# Patient Record
Sex: MALE | Race: WHITE
[De-identification: names, ages, dates, MRNs, and addresses within clinical notes are randomized per-mention and may not be internally consistent; named-entity substitution may affect disease eponyms.]

---

## 2016-01-31 VITALS
DIASTOLIC BLOOD PRESSURE: 89 MMHG | SYSTOLIC BLOOD PRESSURE: 135 MMHG | SYSTOLIC BLOOD PRESSURE: 135 MMHG | DIASTOLIC BLOOD PRESSURE: 89 MMHG

## 2017-03-22 ENCOUNTER — HOSPITAL ENCOUNTER (OUTPATIENT)
Dept: HOSPITAL 44 - LAB | Age: 42
End: 2017-03-22
Attending: FAMILY MEDICINE
Payer: COMMERCIAL

## 2017-03-22 DIAGNOSIS — I10: Primary | ICD-10-CM

## 2017-03-22 LAB
EGFR (AFRICAN): > 60
EGFR (NON-AFRICAN): > 60

## 2017-03-22 PROCEDURE — 36415 COLL VENOUS BLD VENIPUNCTURE: CPT

## 2017-03-22 PROCEDURE — 80048 BASIC METABOLIC PNL TOTAL CA: CPT

## 2017-09-19 ENCOUNTER — HOSPITAL ENCOUNTER (OUTPATIENT)
Dept: HOSPITAL 44 - RAD | Age: 42
End: 2017-09-19
Attending: PHYSICIAN ASSISTANT
Payer: COMMERCIAL

## 2017-09-19 DIAGNOSIS — M79.671: Primary | ICD-10-CM

## 2017-09-19 LAB
BASOPHILS NFR BLD: 0.7 % (ref 0–1.5)
EOSINOPHIL NFR BLD: 2.3 % (ref 0–6.8)
MCH RBC QN AUTO: 30.2 PG (ref 28–34)
MCV RBC AUTO: 83 FL (ref 80–100)
MONOCYTES %: 4.6 % (ref 0–11)
NEUTROPHILS #: 9.1 # K/UL (ref 1.4–7.7)

## 2017-09-19 PROCEDURE — 85025 COMPLETE CBC W/AUTO DIFF WBC: CPT

## 2017-09-19 PROCEDURE — 36415 COLL VENOUS BLD VENIPUNCTURE: CPT

## 2017-09-19 PROCEDURE — 84550 ASSAY OF BLOOD/URIC ACID: CPT

## 2017-09-19 PROCEDURE — 73630 X-RAY EXAM OF FOOT: CPT

## 2017-09-19 NOTE — DIAGNOSTIC IMAGING REPORT
PABLO KENNEDY~

 [facility]

 [address] [facility] [address] [address]

~

Report Submission Date: Sep 19, 2017 2:13:40 PM CDT







Patient ~ Study  

 

Name: ROSALINE EASLEY ~ Date: Sep 19, 2017 1:38:54 PM CDT

 

MRN:  ~ Modality Type: CR

 

Gender: M ~ Description: LOWER EXTREMITY

 

: 77 ~ Institution: [facility]

 

Physician: PABLO KENNEDY

  ~ ~ ~





~

Examination: Plain film foot ~ 



History:~ Discomfort 



Findings: 3 views of the foot demonstrates normal cortical margins. No fracture 
or dislocation. ~Posterior calcaneal spur. ~No soft tissue swelling. No joint 
effusion. 



Impression: No acute osseous process.

~

Electronically signed on Sep 19, 2017 2:13:40 PM CDT by:

Timothy PELAYO

## 2018-11-10 ENCOUNTER — HOSPITAL ENCOUNTER (EMERGENCY)
Dept: HOSPITAL 44 - ED | Age: 43
Discharge: HOME | End: 2018-11-10
Payer: COMMERCIAL

## 2018-11-10 VITALS
DIASTOLIC BLOOD PRESSURE: 96 MMHG | DIASTOLIC BLOOD PRESSURE: 96 MMHG | DIASTOLIC BLOOD PRESSURE: 96 MMHG | SYSTOLIC BLOOD PRESSURE: 134 MMHG | SYSTOLIC BLOOD PRESSURE: 134 MMHG | SYSTOLIC BLOOD PRESSURE: 134 MMHG
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
## 2018-11-10 DIAGNOSIS — S61.412A: Primary | ICD-10-CM
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
## 2018-11-10 PROCEDURE — 12001 RPR S/N/AX/GEN/TRNK 2.5CM/<: CPT

## 2018-11-10 PROCEDURE — 99282 EMERGENCY DEPT VISIT SF MDM: CPT

## 2018-11-10 NOTE — ED PHYSICIAN DOCUMENTATION
General Adult





- HISTORIAN


Historian: patient





- HPI


Stated Complaint: lac to left hand cutting a deer 


Chief Complaint: Laceration/Recheck/Suture


Onset: minutes (30)


Timing: still present


Further Comments: yes (He reports cutting a deer and cut his finger. The knife 

was dirty - he has full sensation no other complaints. He is UTD on tetanus)





- ROS


CONST: no problems


EYES/ENT: none


CVS/RESP: none


MS/SKIN/LYMPH: other (laceration on left hand ring and first finger )





- PAST HX


Past History: none


Other History: none


Surgeries/Procedures: other (cut his finger off 3 years ago )


Immunizations: UTD


Allergies/Adverse Reactions: 


                                    Allergies











Allergy/AdvReac Type Severity Reaction Status Date / Time


 


No Known Drug Allergies Allergy   Unverified 03/08/13 14:25

















- SOCIAL HX


Smoking History: non-smoker


Alcohol Use: none


Drug Use: none





- FAMILY HX


Family History: No





- VITAL SIGNS


Vital Signs: 


                                   Vital Signs











Temp Pulse Resp BP Pulse Ox


 


    86   18   134/96   99 


 


    11/10/18 17:22  11/10/18 17:22  11/10/18 17:22  11/10/18 17:22














- REVIEWED ASSESSMENTS


Nursing Assessment  Reviewed: Yes


Vitals Reviewed: Yes





Procedures


Wound Repaired With: Dermabond (and steri strip without incident )





ED Results Lab/Radiology





- Orders


Orders: 


                                    ED Orders











 Category Date Time Status


 


 Skin Adhesive NOW Care  11/10/18 17:10 Ordered














General Adult Physical Exam





- PHYSICAL EXAM


GENERAL APPEARANCE: no distress


EENT: eye inspection normal


NECK: normal inspection


RESPIRATORY: no resp distress, chest non-tender, breath sounds normal


CVS: reg rate & rhythm, heart sounds normal


ABDOMEN: soft


SKIN: warm/dry, other (middle finger left hand moon shape skin flap and on ring 

finger small cut with skin flap that is no longer attached )


EXTREMITIES: non-tender


NEURO: oriented X3





Discharge


Clincal Impression: 


Laceration of left hand


Qualifiers:


 Encounter type: initial encounter Foreign body presence: without foreign body 

Qualified Code(s): S61.412A - Laceration without foreign body of left hand, 

initial encounter





Referrals: 


Devi Kam MD [Primary Care Provider] - 2 Days


Comments: 





1. Keep area clean and dry 


2. Bactrim DS take 1 by mouth twice daily x 10 days


3. See PCP in 2-4 days for any concerns


4. Return to ER for any concerns 


Condition: Stable


Disposition: 01 HOME, SELF-CARE


Decision to Admit: NO


Date of Decison to Admit: 11/10/18


Decision Time: 17:17

## 2019-01-04 ENCOUNTER — HOSPITAL ENCOUNTER (OUTPATIENT)
Dept: HOSPITAL 44 - LAB | Age: 44
End: 2019-01-04
Attending: NURSE PRACTITIONER
Payer: COMMERCIAL

## 2019-01-04 DIAGNOSIS — R73.9: Primary | ICD-10-CM

## 2019-01-04 DIAGNOSIS — I10: ICD-10-CM

## 2019-01-04 LAB — EGFR (NON-AFRICAN): > 60

## 2019-01-04 PROCEDURE — 36415 COLL VENOUS BLD VENIPUNCTURE: CPT

## 2019-01-04 PROCEDURE — 83036 HEMOGLOBIN GLYCOSYLATED A1C: CPT

## 2019-01-04 PROCEDURE — 80053 COMPREHEN METABOLIC PANEL: CPT

## 2019-01-04 PROCEDURE — 80061 LIPID PANEL: CPT

## 2020-01-09 ENCOUNTER — HOSPITAL ENCOUNTER (OUTPATIENT)
Dept: HOSPITAL 44 - LAB | Age: 45
End: 2020-01-09
Attending: FAMILY MEDICINE
Payer: COMMERCIAL

## 2020-01-09 DIAGNOSIS — Z13.1: ICD-10-CM

## 2020-01-09 DIAGNOSIS — Z13.0: ICD-10-CM

## 2020-01-09 DIAGNOSIS — Z13.220: Primary | ICD-10-CM

## 2020-01-09 LAB
APPEARANCE UR: CLEAR
COLOR,URINE: YELLOW
EGFR (NON-AFRICAN): > 60
HDL: 36 MG/DL (ref 40–?)
MCV RBC AUTO: 91 FL (ref 80–100)
PH UR STRIP: 5.5 [PH] (ref 5–8)
RBC UR QL: NEGATIVE
UROBILINOGEN URINE: 0.2 EU (ref 0.2–1)

## 2020-01-09 PROCEDURE — 36415 COLL VENOUS BLD VENIPUNCTURE: CPT

## 2020-01-09 PROCEDURE — 81002 URINALYSIS NONAUTO W/O SCOPE: CPT

## 2020-01-09 PROCEDURE — 80061 LIPID PANEL: CPT

## 2020-01-09 PROCEDURE — 80053 COMPREHEN METABOLIC PANEL: CPT

## 2020-01-09 PROCEDURE — 85027 COMPLETE CBC AUTOMATED: CPT
